# Patient Record
(demographics unavailable — no encounter records)

---

## 2024-12-10 NOTE — PHYSICAL EXAM

## 2024-12-10 NOTE — ASSESSMENT
[FreeTextEntry1] : Patient with bubbling in the stomach and diarrhea.  This started after she ate food out of the freezer.  The food was not dated.  She may have developed an episode of gastroenteritis.  CAT scan in the emergency room revealed evidence of ileus with dilated small bowel without any transition point.  Patient has improved but still has some loose bowel movement once a day without mucus or blood.  She will be given a course of Xifaxan for 10 days.  Blood work will be done.  FOBT, fecal calprotectin, and stool PCR will be ordered.  Iron studies will be done.

## 2024-12-10 NOTE — HISTORY OF PRESENT ILLNESS
[FreeTextEntry1] : 12/10/2024-patient has a 84-year-old female who has generally been in good health.  She presented with severe diarrhea which started before Thanksgiving.  She took some food out of the freezer that had been there for a while and was not dated.  She developed multiple bouts of watery diarrhea and took Pepto-Bismol and Kaopectate.  She did not have any abdominal pain but described a bubbling sensation in the mid abdomen.  She was seen in the emergency room on 2 occasions.  The first 1 was the end of November and also the beginning of December.  CAT scan on both occasions revealed dilated small bowel consistent with ileus without any transition point suggesting small bowel obstruction. At the end of November she was given antibiotics to take.  She took the antibiotics for 10 days but only once a day.  Her symptoms have improved but she still has a loose bowel movement per day.  She has no nausea or vomiting.  She has reduced her p.o. intake.  She claims to have lost about 20 pounds over the past year but she is also changed her diet. Blood work from the emergency room revealed H/H 9.6/29.5, MCV 89.1, creatinine 1.33.  A year ago she did have evidence of iron deficiency.  Attached below is the emergency room note and the CAT scan from the ER visit.   - Chief Complaint: The patient is a 84y Female complaining of - HPI Objective Statement: 84F with no significant pmhx presents to the ED complaining of lower abdominal discomfort x 2 weeks. Patient describes the discomfort as a "bubbly" feeling that radiates across the lower abdomen. She states symptoms are associated with eating and drinking, and thus has decreased PO intake. Patient denies prior abdominal surgeries, prior colonoscopy, or recent travel. She was evaluated in the ED 11/24 and 12/4 for same symptoms. She had CT noncon and CT with contrast of abd/pel with dilated small bowel suggestive of ileus with enteritis. Patient presented with diarrhea at that time and was given Augmentin x 10 days. She reports she is only taking 1 pill of Augmentin each day so is still taking it. States her diarrhea improved - now reporting loose stool. Nonbloody and no melena. Denies nausea, vomiting, chest pain, sob, urinary symptoms, fever, or chills.    ACC: [id number]     EXAM:  CT ABDOMEN AND PELVIS OC   ORDERED BY: RILEY CARDONA  PROCEDURE DATE:  12/04/2024    INTERPRETATION:  CLINICAL INFORMATION: Evaluate for small bowel obstruction  COMPARISON: None  CONTRAST/COMPLICATIONS: IV Contrast: None Oral Contrast: Omnipaque 300   PROCEDURE: CT of the Abdomen and Pelvis was performed. Sagittal and coronal reformats were performed.  FINDINGS: LOWER CHEST: Valvular calcifications. Trace pericardial fluid. Bibasilar atelectasis, right greater than left.  LIVER: The noncontrast appearance of the liver is unremarkable. BILE DUCTS: Normal caliber. GALLBLADDER: Within normal limits. SPLEEN: Within normal limits. PANCREAS: Within normal limits. ADRENALS: Mild thickening of the adrenal glands, likely hyperplasia. KIDNEYS/URETERS: No hydronephrosis  BLADDER: The bladder is decompressed. REPRODUCTIVE ORGANS: Multiple calcified uterine fibroids.  BOWEL: The stomach is normal in caliber. There is diffuse dilation of the small bowel, measuring up to 4 cm. The dilation extends to the level of the terminal ileum. Additionally, there is fluid within a nondilated colon. These findings are most suggestive of an ileus. Normal appendix. PERITONEUM/RETROPERITONEUM: Within normal limits. VESSELS: Atherosclerotic changes. LYMPH NODES: No lymphadenopathy. ABDOMINAL WALL: Within normal limits. BONES: Multilevel degenerative changes.  IMPRESSION:  Findings suggestive of ileus. No definite transition point to suggest obstruction.    --- End of Report ---

## 2025-01-22 NOTE — ASSESSMENT
[FreeTextEntry1] : 84-year-old female status post laparoscopic right hemicolectomy recovering well.  Plan: Follow-up with oncology Follow-up with me in 1 month

## 2025-01-22 NOTE — HISTORY OF PRESENT ILLNESS
[FreeTextEntry1] : 84-year-old female status post laparoscopic right hemicolectomy for ascending colon near obstructing lesion.  Her pathology has resulted.  She feels well and having normal bowel function.

## 2025-01-29 NOTE — HISTORY OF PRESENT ILLNESS
[Disease: _____________________] : Disease: [unfilled] [T: ___] : T[unfilled] [N: ___] : N[unfilled] [de-identified] : Ms. Hendrickson is an 84 yr old woman with no significant PMH who presents for initial consultation for recently diagnosed colon cancer   Chronological Hx of Cancer: November '24-Dec '24: 2 ED visits for enteritis/ileus/obstruction (on CTs); GI PCR negative, FOBT (+), iron deficient 12/23/24-01/10/25: hospitalized to [facility] for abdominal discomfort and multifocal PNA 12/23/24: CT a/p showed SBO vs. 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 arising in a tubular adenoma w high grade dysplasia   PMH: uterine fibroids PSxHx: colonoscopy (12/26/24), R hemicolectomy (01/08/25); tonsillectomy, L ankle surgery Meds: eliquis, diltiazem, tylenol Social Hx: never smoker, no alcohol use, no drugs; lives alone; has 2 grown sons, one in [place] and one in [place]; drives alone; lives in [facility] Hx:  Allergies: NKA  Molecular: NABEEL Sites: Cecal   [de-identified] : 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 Saw surgery for f/u on 1/22/25- wounds healing well, staples removed. Never had prior colonoscopies before these recent hospitalizations, never was sick or hospitalized. Feels well. Eating solids. Has 2-3 meals a day. Has ~2 BMs daily, some loose others well formed depending on diet. No blood in BMs. No straining. Her baseline BMs were BID+ depending on diet. Taking diltiazem but not eliquis.

## 2025-01-29 NOTE — REVIEW OF SYSTEMS
[Diarrhea: Grade 1 - Increase of <4 stools per day over baseline; mild increase in ostomy output compared to baseline] : Diarrhea: Grade 1 - Increase of <4 stools per day over baseline; mild increase in ostomy output compared to baseline [Fever] : no fever [Chills] : no chills [Night Sweats] : no night sweats [Fatigue] : no fatigue [Dysphagia] : no dysphagia [Odynophagia] : no odynophagia [Chest Pain] : no chest pain [Palpitations] : no palpitations [Shortness Of Breath] : no shortness of breath [Wheezing] : no wheezing [Cough] : no cough [Abdominal Pain] : no abdominal pain [Vomiting] : no vomiting [Constipation] : no constipation [Dysuria] : no dysuria [Skin Rash] : no skin rash [Easy Bleeding] : no tendency for easy bleeding [Easy Bruising] : no tendency for easy bruising

## 2025-01-29 NOTE — HISTORY OF PRESENT ILLNESS
[Disease: _____________________] : Disease: [unfilled] [T: ___] : T[unfilled] [N: ___] : N[unfilled] [de-identified] : Ms. Hendrickson is an 84 yr old woman with no significant PMH who presents for initial consultation for recently diagnosed colon cancer   Chronological Hx of Cancer: November '24-Dec '24: 2 ED visits for enteritis/ileus/obstruction (on CTs); GI PCR negative, FOBT (+), iron deficient 12/23/24-01/10/25: hospitalized to [facility] for abdominal discomfort and multifocal PNA 12/23/24: CT a/p showed SBO vs. 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 arising in a tubular adenoma w high grade dysplasia   PMH: uterine fibroids PSxHx: colonoscopy (12/26/24), R hemicolectomy (01/08/25); tonsillectomy, L ankle surgery Meds: eliquis, diltiazem, tylenol Social Hx: never smoker, no alcohol use, no drugs; lives alone; has 2 grown sons, one in [place] and one in [place]; drives alone; lives in [place] [facility] Hx:  Allergies: NKA  Molecular: NABEEL Sites: Cecal   [de-identified] : 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 Saw surgery for f/u on 1/22/25- wounds healing well, staples removed. Never had prior colonoscopies before these recent hospitalizations, never was sick or hospitalized. Feels well. Eating solids. Has 2-3 meals a day. Has ~2 BMs daily, some loose others well formed depending on diet. No blood in BMs. No straining. Her baseline BMs were BID+ depending on diet. Taking diltiazem but not eliquis.

## 2025-01-29 NOTE — PHYSICAL EXAM
[Restricted in physically strenuous activity but ambulatory and able to carry out work of a light or sedentary nature] : Status 1- Restricted in physically strenuous activity but ambulatory and able to carry out work of a light or sedentary nature, e.g., light house work, office work [Normal] : no JVD, no calf tenderness, venous stasis changes, varices [de-identified] : Normal WoB [de-identified] : RRR [de-identified] : Nontender, well healing

## 2025-01-29 NOTE — RESULTS/DATA
[FreeTextEntry1] : LABS: 12/31/24: CEA  2.6,  107, CA 19-9 <2  PATHOLOGY REPORTS:  ACCESSION No:  [id number] Patient:     KAVITHA WOODALL Accession:                             [id number] Collected Date/Time:                   12/26/2024 12:35 EST Received Date/Time:                    12/26/2024 12:35 EST Surgical Pathology Report - Auth (Verified) Specimen(s) Submitted 1-Cecal mass bx Final Diagnosis 1.  Colon, cecal mass, biopsy: -   Moderately differentiated invasive adenocarcinoma.  See comment.  Comment:  diagnosis confirmed upon review by another member of the GI  pathology department. Dr. Hartley notified via secure email. Verified by: Cornelius Singh MD (Electronic Signature) Reported on: 12/27/24 14:41 EST, [facility] [id number] [place] [address] Phone: [phone]   Fax: [phone] _________________________________________________________________  ACCESSION No:  [id number] Patient:     KAVITHA WOODALL Accession:                             [id number] Collected Date/Time:                   1/8/2025 09:12 EST Received Date/Time:                    1/9/2025 09:12 EST Addendum Report - Auth (Verified) Addendum Reporting Template (CAP June 2021) DNA MISMATCH REPAIR TESTING Specimen Site: Cecum and ileocecal valve Testing Performed on Block Number(s) (specify): 1G Immunohistochemistry Testing (IHC) for Mismatch Repair (MMR) Proteins show following result: MLH1:  Intact nuclear expression MSH2:  Intact nuclear expression MSH6:  Intact nuclear expression PMS2:  Intact nuclear expression MMR Interpretation: No loss of nuclear expression of MMR proteins (MLH1, MSH2, MSH6, and PMS2).  These results show low probability of microsatellite instability-high (MSI-H). There is no evidence of DNA mismatch repair deficiency in the analyzed tissue, indicating there is a low probability for Hernandes syndrome (a common cause hereditary non polyposis colorectal cancer or HNPCC). However, intact expression of all 4 proteins indicates that MMR enzyme tested are intact but does not entirely exclude Hernandes syndrome, as approximately 5 % of families may have a missense mutation (especially MLH1) that can lead to a nonfunctional protein with retained antigenicity. Clinical correlation with further evaluation is suggested.   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 (6.0 cm) arising in a tubular adenoma with high-grade dysplasia - Small vessel lymphovascular invasion present - Perineural invasion present - Margins of resection are negative for neoplasm - One lymph node positive for carcinoma (1/38) - Benign appendix  Histologic Type:   Adenocarcinoma Histologic Grade:   G2, moderately differentiated Tumor Size:  6.0 Centimeters (cm) Tumor Extent:   Invades through muscularis propria into the pericolonic or perirectal tissue Macroscopic Tumor Perforation:   Not identified Lymphatic and / or Vascular Invasion:    Small vessel Perineural Invasion:   Present Tumor Budding Score:   Intermediate (5-9) Number of Tumor Buds:   6 per 'hotspot' field Type of Polyp in which Invasive Carcinoma Arose:    Tubular adenoma Treatment Effect:   No known presurgical therapy Margins Margin Status for Invasive Carcinoma:    All margins negative for invasive carcinoma pTNM Classification (AJCC 8th Edition) pT Category:   pT3 pN Category:   pN1a

## 2025-01-29 NOTE — RESULTS/DATA
[FreeTextEntry1] : LABS: 12/31/24: CEA  2.6,  107, CA 19-9 <2  PATHOLOGY REPORTS:  ACCESSION No:  [id number] Patient:     KAVITHA WOODALL Accession:                             [id number] Collected Date/Time:                   12/26/2024 12:35 EST Received Date/Time:                    12/26/2024 12:35 EST Surgical Pathology Report - Auth (Verified) Specimen(s) Submitted 1-Cecal mass bx Final Diagnosis 1.  Colon, cecal mass, biopsy: -   Moderately differentiated invasive adenocarcinoma.  See comment.  Comment:  diagnosis confirmed upon review by another member of the GI  pathology department. Dr. Hartley notified via secure email. Verified by: Cornelius Singh MD (Electronic Signature) Reported on: 12/27/24 14:41 EST, [facility] [address] [facility] [place] [address] Phone: [phone]   Fax: [phone] _________________________________________________________________  ACCESSION No:  [id number] Patient:     KAVITHA WOODALL Accession:                             [id number] Collected Date/Time:                   1/8/2025 09:12 EST Received Date/Time:                    1/9/2025 09:12 EST Addendum Report - Auth (Verified) Addendum Reporting Template (CAP June 2021) DNA MISMATCH REPAIR TESTING Specimen Site: Cecum and ileocecal valve Testing Performed on Block Number(s) (specify): 1G Immunohistochemistry Testing (IHC) for Mismatch Repair (MMR) Proteins show following result: MLH1:  Intact nuclear expression MSH2:  Intact nuclear expression MSH6:  Intact nuclear expression PMS2:  Intact nuclear expression MMR Interpretation: No loss of nuclear expression of MMR proteins (MLH1, MSH2, MSH6, and PMS2).  These results show low probability of microsatellite instability-high (MSI-H). There is no evidence of DNA mismatch repair deficiency in the analyzed tissue, indicating there is a low probability for Hernandes syndrome (a common cause hereditary non polyposis colorectal cancer or HNPCC). However, intact expression of all 4 proteins indicates that MMR enzyme tested are intact but does not entirely exclude Hernandes syndrome, as approximately 5 % of families may have a missense mutation (especially MLH1) that can lead to a nonfunctional protein with retained antigenicity. Clinical correlation with further evaluation is suggested.   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 (6.0 cm) arising in a tubular adenoma with high-grade dysplasia - Small vessel lymphovascular invasion present - Perineural invasion present - Margins of resection are negative for neoplasm - One lymph node positive for carcinoma (1/38) - Benign appendix  Histologic Type:   Adenocarcinoma Histologic Grade:   G2, moderately differentiated Tumor Size:  6.0 Centimeters (cm) Tumor Extent:   Invades through muscularis propria into the pericolonic or perirectal tissue Macroscopic Tumor Perforation:   Not identified Lymphatic and / or Vascular Invasion:    Small vessel Perineural Invasion:   Present Tumor Budding Score:   Intermediate (5-9) Number of Tumor Buds:   6 per 'hotspot' field Type of Polyp in which Invasive Carcinoma Arose:    Tubular adenoma Treatment Effect:   No known presurgical therapy Margins Margin Status for Invasive Carcinoma:    All margins negative for invasive carcinoma pTNM Classification (AJCC 8th Edition) pT Category:   pT3 pN Category:   pN1a

## 2025-01-29 NOTE — ASSESSMENT
[FreeTextEntry1] : Ms. Fermin is a 84 year old woman with recent history of stage III colon cancer who presents status post curative intent resection 12/2024.   Today we discussed the natural history of colon cancer, indications for treatment, data from [facility] and [facility] which indicate a significant benefit of treatment for stage III colon cancer. We went on to discuss that given the presence of brannon disease, she is considered to harbor high-risk disease and chemotherapy would be appropriate. Although there is ~ a 40% chance that her disease has been eradicated by surgery alone, the addition of adjuvant chemotherapy would reduce that risk by about an additional 25%. In the setting of stage III colon cancer I would normally recommend a combination of 5-FU/Capecitabine and oxaliplatin. However, due to advanced age and based on [facility] post-hoc analysis, Oxaliplatin is of questionable benefit given her age. As such, we will proceed with 3-6 months of capecitabine and watch closely for side effects.   #Stage III Colon Cancer: - Not appropriate for CIRCULATE given age - CAPEOX x 3-6 months       - 1000mg/m2 BID Capecitabine D1-D14 (Round down to 2 pills 500 in AM and 3 pills 500 in AM based on BSA 1.5)  #Chemotherapy Induced Diarrhea - Loperamide  #Chemotherapy Induced Nausea - Compazine/Zofran  #Chemotherapy Induced Hand Foot Syndrome Prevention - Diclofenac  RTC in 3 weeks to start therapy (aim for 6 weeks post-operative.

## 2025-01-29 NOTE — PHYSICAL EXAM
[Restricted in physically strenuous activity but ambulatory and able to carry out work of a light or sedentary nature] : Status 1- Restricted in physically strenuous activity but ambulatory and able to carry out work of a light or sedentary nature, e.g., light house work, office work [Normal] : no JVD, no calf tenderness, venous stasis changes, varices [de-identified] : Normal WoB [de-identified] : RRR [de-identified] : Nontender, well healing

## 2025-02-05 NOTE — HISTORY OF PRESENT ILLNESS
[FreeTextEntry1] : Referring Physician:   Dear     Ms. Hendrickson was seen in the [facility] today. For our records, please allow me to summarize the history and my findings.   This pleasant 84-year-old woman has a cardiovascular history significant for colon CA presents with SBO findings with cecal mass and multifocal pneumonia and R upper segmental PE findings on CT of chest. Patient underwent R hemicolectomy on 1/8. She was noticed having frequent intermittent short bursts of SVT up to 170 bpm this am. Endorses "mild palpitation" associated with SVT. Denies SOB or chest pain or syncope. Denies prior hx of tachycardia. Baseline EKG sinus tachy with RBBB. SVT appears short RP. Self terminated with each episode of SVT seen at bedside. Remains hemodynamically in SVT. 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 She is taking diltiazem 240 mg daily but did not start the Eliquis 5 mg twice a day.    Ms. Hendrickson denies any recent history of chest pain, shortness of breath, palpitations, dizziness, or syncope.

## 2025-02-05 NOTE — DISCUSSION/SUMMARY
[FreeTextEntry1] :   In summary, this is an 84-year-old woman with for colon CA presents with SBO findings with cecal mass and multifocal pneumonia and R upper segmental PE findings on CT of chest. Patient underwent R hemicolectomy on 1/8. She was noticed having frequent intermittent short bursts of SVT up to 170 bpm. Will continue diltiazem 240 mg daily and will start the Eliquis for PE. She will follow up in the office in 3months and may discuss ablation at that time.   MS. Hendrickson appeared to understand the whole discussion and verbalized that all of his questions were answered to his satisfaction.  Thank you for allowing me to be involved in the care of this pleasant woman. Please feel free to contact me with any questions.  [EKG obtained to assist in diagnosis and management of assessed problem(s)] : EKG obtained to assist in diagnosis and management of assessed problem(s)

## 2025-02-05 NOTE — CARDIOLOGY SUMMARY
[de-identified] : 1/27/2025 TTE CONCLUSIONS:  1. Left ventricular cavity is normal in size. Left ventricular wall thickness is normal. Left ventricular systolic function is normal with an ejection fraction of 75 % by Mandujano's method of disks. There are no regional wall motion abnormalities seen.  2. There is mild (grade 1) left ventricular diastolic dysfunction.  3. Normal right ventricular cavity size and normal right ventricular systolic function. Tricuspid annular plane systolic excursion (TAPSE) is 2.0 cm (normal >=1.7 cm).  4. Structurally normal mitral valve with normal leaflet excursion. There is reduced leaflet mobility of the mitral valve. There is calcification of the mitral valve annulus. There is mild mitral valve stenosis. The transmitral peak gradient is 8.2 mmHg and mean gradient is 3.15 mmHg. There is trace mitral regurgitation.  5. The aortic valve appears trileaflet with reduced systolic excursion. There is calcification of the aortic valve leaflets. There is mild aortic stenosis. The peak transaortic velocity is 2.55 m/s, peak transaortic gradient is 26.0 mmHg and mean transaortic gradient is 14.2 mmHg with an LVOT/aortic valve VTI ratio of 0.59.  6. The left atrium is severely dilated with an indexed volume of 58.74 ml/m.   [de-identified] : 2/5/25 Sinus rhythm 89, RBBB, NSST

## 2025-02-19 NOTE — HISTORY OF PRESENT ILLNESS
[Disease: _____________________] : Disease: [unfilled] [T: ___] : T[unfilled] [N: ___] : N[unfilled] [de-identified] : Ms. Hendrickson is an 84 yr old woman with no significant PMH who presents for initial consultation for recently diagnosed colon cancer   Chronological Hx of Cancer: November '24-Dec '24: 2 ED visits for enteritis/ileus/obstruction (on CTs); GI PCR negative, FOBT (+), iron deficient 12/23/24-01/10/25: hospitalized to [facility] for abdominal discomfort and multifocal PNA 12/23/24: CT a/p showed SBO vs. 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 Saw surgery for f/u on 1/22/25- wounds healing well, staples removed. Never had prior colonoscopies before these recent hospitalizations, never was sick or hospitalized. Feels well. Eating solids. Has 2-3 meals a day. Has ~2 BMs daily, some loose others well formed depending on diet. No blood in BMs. No straining. Her baseline BMs were BID+ depending on diet. Taking diltiazem but not eliquis.   PMH: uterine fibroids PSxHx: colonoscopy (12/26/24), R hemicolectomy (01/08/25); tonsillectomy, L ankle surgery Meds: eliquis, diltiazem, tylenol Social Hx: never smoker, no alcohol use, no drugs; lives alone; has 2 grown sons, one in [place] and one in [place]; drives alone; lives in 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 She is taking Eliquis. Plan for Xeloda 2 weeks on, one week off.

## 2025-02-19 NOTE — PHYSICAL EXAM
[Restricted in physically strenuous activity but ambulatory and able to carry out work of a light or sedentary nature] : Status 1- Restricted in physically strenuous activity but ambulatory and able to carry out work of a light or sedentary nature, e.g., light house work, office work [Normal] : no JVD, no calf tenderness, venous stasis changes, varices [de-identified] : Normal WoB [de-identified] : RRR [de-identified] : Nontender, well healing

## 2025-02-19 NOTE — ASSESSMENT
[FreeTextEntry1] : Ms. Fermin is a 84 year old woman with recent history of stage III colon cancer who presents status post curative intent resection 12/2024.   Today we discussed the natural history of colon cancer, indications for treatment, data from IDEA and MOSAIC which indicate a significant benefit of treatment for stage III colon cancer. We went on to discuss that given the presence of brannon disease, she is considered to harbor high-risk disease and chemotherapy would be appropriate. Although there is ~ a 40% chance that her disease has been eradicated by surgery alone, the addition of adjuvant chemotherapy would reduce that risk by about an additional 25%. In the setting of stage III colon cancer I would normally recommend a combination of 5-FU/Capecitabine and oxaliplatin. However, due to advanced age and based on MOSAIC post-hoc analysis, Oxaliplatin is of questionable benefit given her age. As such, we will proceed with 3-6 months of capecitabine and watch closely for side effects.   2/19/25: C1D1 Capecitabine. Does not yet have diclofenac, will pickup today.   #Stage III Colon Cancer: - Not appropriate for CIRCULATE given age - Capecitabine x 6 months       - 1000mg/m2 BID Capecitabine D1-D14 (Round down to 2 pills 500 in AM and 3 pills 500 in AM based on BSA 1.5) - Diclofenac gel for hands/feet  #Chemotherapy Induced Diarrhea - Loperamide  #Chemotherapy Induced Nausea - Compazine/Zofran  #Chemotherapy Induced Hand Foot Syndrome Prevention - Diclofenac  RTC in 3 weeks to monitor response to therapy  Seen with fellow Dr. uMir

## 2025-02-19 NOTE — RESULTS/DATA
[FreeTextEntry1] : LABS: 12/31/24: CEA  2.6,  107, CA 19-9 <2  PATHOLOGY REPORTS:  ACCESSION No:  [id number] Patient:     KAVITHA WOODALL Accession:                             [id number] Collected Date/Time:                   12/26/2024 12:35 EST Received Date/Time:                    12/26/2024 12:35 EST Surgical Pathology Report - Auth (Verified) Specimen(s) Submitted 1-Cecal mass bx Final Diagnosis 1.  Colon, cecal mass, biopsy: -   Moderately differentiated invasive adenocarcinoma.  See comment.  Comment:  diagnosis confirmed upon review by another member of the GI  pathology department. Dr. Hartley notified via secure email. Verified by: Cornelius Singh MD (Electronic Signature) Reported on: 12/27/24 14:41 EST, [facility] [id number] [facility] [place] [address] Phone: [phone]   Fax: [phone] _________________________________________________________________  ACCESSION No:  [id number] Patient:     KAVITHA WOODALL Accession:                             [id number] Collected Date/Time:                   1/8/2025 09:12 EST Received Date/Time:                    1/9/2025 09:12 EST Addendum Report - Auth (Verified) Addendum Reporting Template (CAP June 2021) DNA MISMATCH REPAIR TESTING Specimen Site: Cecum and ileocecal valve Testing Performed on Block Number(s) (specify): 1G Immunohistochemistry Testing (IHC) for Mismatch Repair (MMR) Proteins show following result: MLH1:  Intact nuclear expression MSH2:  Intact nuclear expression MSH6:  Intact nuclear expression PMS2:  Intact nuclear expression MMR Interpretation: No loss of nuclear expression of MMR proteins (MLH1, MSH2, MSH6, and PMS2).  These results show low probability of microsatellite instability-high (MSI-H). There is no evidence of DNA mismatch repair deficiency in the analyzed tissue, indicating there is a low probability for Hernandes syndrome (a common cause hereditary non polyposis colorectal cancer or HNPCC). However, intact expression of all 4 proteins indicates that MMR enzyme tested are intact but does not entirely exclude Hernandes syndrome, as approximately 5 % of families may have a missense mutation (especially MLH1) that can lead to a nonfunctional protein with retained antigenicity. Clinical correlation with further evaluation is suggested.   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 (6.0 cm) arising in a tubular adenoma with high-grade dysplasia - Small vessel lymphovascular invasion present - Perineural invasion present - Margins of resection are negative for neoplasm - One lymph node positive for carcinoma (1/38) - Benign appendix  Histologic Type:   Adenocarcinoma Histologic Grade:   G2, moderately differentiated Tumor Size:  6.0 Centimeters (cm) Tumor Extent:   Invades through muscularis propria into the pericolonic or perirectal tissue Macroscopic Tumor Perforation:   Not identified Lymphatic and / or Vascular Invasion:    Small vessel Perineural Invasion:   Present Tumor Budding Score:   Intermediate (5-9) Number of Tumor Buds:   6 per 'hotspot' field Type of Polyp in which Invasive Carcinoma Arose:    Tubular adenoma Treatment Effect:   No known presurgical therapy Margins Margin Status for Invasive Carcinoma:    All margins negative for invasive carcinoma pTNM Classification (AJCC 8th Edition) pT Category:   pT3 pN Category:   pN1a

## 2025-03-12 NOTE — REASON FOR VISIT
[Follow-Up Visit] : a follow-up Rhombic Flap Text: The defect edges were debeveled with a #15 scalpel blade.  Given the location of the defect and the proximity to free margins a rhombic flap was deemed most appropriate.  Using a sterile surgical marker, an appropriate rhombic flap was drawn incorporating the defect.    The area thus outlined was incised deep to adipose tissue with a #15 scalpel blade.  The skin margins were undermined to an appropriate distance in all directions utilizing iris scissors.

## 2025-03-12 NOTE — RESULTS/DATA
[FreeTextEntry1] : LABS: 12/31/24: CEA  2.6,  107, CA 19-9 <2  PATHOLOGY REPORTS:  ACCESSION No:  [id number] Patient:     KAVITHA WOODALL Accession:                             [id number] Collected Date/Time:                   12/26/2024 12:35 EST Received Date/Time:                    12/26/2024 12:35 EST Surgical Pathology Report - Auth (Verified) Specimen(s) Submitted 1-Cecal mass bx Final Diagnosis 1.  Colon, cecal mass, biopsy: -   Moderately differentiated invasive adenocarcinoma.  See comment.  Comment:  diagnosis confirmed upon review by another member of the GI  pathology department. Dr. Hartley notified via secure email. Verified by: Cornelius Singh MD (Electronic Signature) Reported on: 12/27/24 14:41 EST, [facility] [id number] [facility] [address] Phone: [phone]   Fax: [phone] _________________________________________________________________  ACCESSION No:  [id number] Patient:     KAVITHA WOODALL Accession:                             [id number] Collected Date/Time:                   1/8/2025 09:12 EST Received Date/Time:                    1/9/2025 09:12 EST Addendum Report - Auth (Verified) Addendum Reporting Template (CAP June 2021) DNA MISMATCH REPAIR TESTING Specimen Site: Cecum and ileocecal valve Testing Performed on Block Number(s) (specify): 1G Immunohistochemistry Testing (IHC) for Mismatch Repair (MMR) Proteins show following result: MLH1:  Intact nuclear expression MSH2:  Intact nuclear expression MSH6:  Intact nuclear expression PMS2:  Intact nuclear expression MMR Interpretation: No loss of nuclear expression of MMR proteins (MLH1, MSH2, MSH6, and PMS2).  These results show low probability of microsatellite instability-high (MSI-H). There is no evidence of DNA mismatch repair deficiency in the analyzed tissue, indicating there is a low probability for Hernandes syndrome (a common cause hereditary non polyposis colorectal cancer or HNPCC). However, intact expression of all 4 proteins indicates that MMR enzyme tested are intact but does not entirely exclude Hernandes syndrome, as approximately 5 % of families may have a missense mutation (especially MLH1) that can lead to a nonfunctional protein with retained antigenicity. Clinical correlation with further evaluation is suggested.   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 (6.0 cm) arising in a tubular adenoma with high-grade dysplasia - Small vessel lymphovascular invasion present - Perineural invasion present - Margins of resection are negative for neoplasm - One lymph node positive for carcinoma (1/38) - Benign appendix  Histologic Type:   Adenocarcinoma Histologic Grade:   G2, moderately differentiated Tumor Size:  6.0 Centimeters (cm) Tumor Extent:   Invades through muscularis propria into the pericolonic or perirectal tissue Macroscopic Tumor Perforation:   Not identified Lymphatic and / or Vascular Invasion:    Small vessel Perineural Invasion:   Present Tumor Budding Score:   Intermediate (5-9) Number of Tumor Buds:   6 per 'hotspot' field Type of Polyp in which Invasive Carcinoma Arose:    Tubular adenoma Treatment Effect:   No known presurgical therapy Margins Margin Status for Invasive Carcinoma:    All margins negative for invasive carcinoma pTNM Classification (AJCC 8th Edition) pT Category:   pT3 pN Category:   pN1a

## 2025-03-12 NOTE — PHYSICAL EXAM
[Restricted in physically strenuous activity but ambulatory and able to carry out work of a light or sedentary nature] : Status 1- Restricted in physically strenuous activity but ambulatory and able to carry out work of a light or sedentary nature, e.g., light house work, office work [Normal] : no JVD, no calf tenderness, venous stasis changes, varices [de-identified] : Normal WoB [de-identified] : RRR [de-identified] : Nontender, well healing

## 2025-03-12 NOTE — REVIEW OF SYSTEMS
[Fever] : no fever [Chills] : no chills [Night Sweats] : no night sweats [Fatigue] : no fatigue [Dysphagia] : no dysphagia [Odynophagia] : no odynophagia [Chest Pain] : no chest pain [Palpitations] : no palpitations [Shortness Of Breath] : no shortness of breath [Wheezing] : no wheezing [Cough] : no cough [Abdominal Pain] : no abdominal pain [Vomiting] : no vomiting [Constipation] : no constipation [Diarrhea: Grade 1 - Increase of <4 stools per day over baseline; mild increase in ostomy output compared to baseline] : Diarrhea: Grade 1 - Increase of <4 stools per day over baseline; mild increase in ostomy output compared to baseline [Dysuria] : no dysuria [Skin Rash] : no skin rash [Easy Bleeding] : no tendency for easy bleeding [Easy Bruising] : no tendency for easy bruising

## 2025-03-12 NOTE — HISTORY OF PRESENT ILLNESS
[Disease: _____________________] : Disease: [unfilled] [T: ___] : T[unfilled] [N: ___] : N[unfilled] [de-identified] : Ms. Hendrickson is an 84 yr old woman with no significant PMH who presents for initial consultation for recently diagnosed colon cancer   Chronological Hx of Cancer: November '24-Dec '24: 2 ED visits for enteritis/ileus/obstruction (on CTs); GI PCR negative, FOBT (+), iron deficient 12/23/24-01/10/25: hospitalized to [facility] for abdominal discomfort and multifocal PNA 12/23/24: CT a/p showed SBO vs. 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 Saw surgery for f/u on 1/22/25- wounds healing well, staples removed. Never had prior colonoscopies before these recent hospitalizations, never was sick or hospitalized. Feels well. Eating solids. Has 2-3 meals a day. Has ~2 BMs daily, some loose others well formed depending on diet. No blood in BMs. No straining. Her baseline BMs were BID+ depending on diet. Taking diltiazem but not eliquis.   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 She feels well. She is taking Eliquis. Plan for Xeloda 2 weeks on, one week off. 3/12/25: C2 Xeloda. Does not even notice she is taking oral chemotherapy.

## 2025-03-12 NOTE — ASSESSMENT
[FreeTextEntry1] : Ms. Fermin is a 84 year old woman with recent history of stage III colon cancer who presents status post curative intent resection 12/2024.   Today we discussed the natural history of colon cancer, indications for treatment, data from IDEA and MOSAIC which indicate a significant benefit of treatment for stage III colon cancer. We went on to discuss that given the presence of brannon disease, she is considered to harbor high-risk disease and chemotherapy would be appropriate. Although there is ~ a 40% chance that her disease has been eradicated by surgery alone, the addition of adjuvant chemotherapy would reduce that risk by about an additional 25%. In the setting of stage III colon cancer I would normally recommend a combination of 5-FU/Capecitabine and oxaliplatin. However, due to advanced age and based on MOSAIC post-hoc analysis, Oxaliplatin is of questionable benefit given her age. As such, we will proceed with 3-6 months of capecitabine and watch closely for side effects.   2/19/25: C1D1 Capecitabine. Does not yet have diclofenac, will pickup today.  3/12/25: C2D1 Capecitabine. Tolerating extremely well. Needs a PCP. Reinforced compliance, went over symptoms at length, rechecking labs today to assess for toxicity.   #Stage III Colon Cancer: - Not appropriate for CIRCULATE given age - Capecitabine x 6 months       - 1000mg/m2 BID Capecitabine D1-D14 (Round down to 2 pills 500 in AM and 3 pills 500 in AM based on BSA 1.5) - Diclofenac gel for hands/feet - CBC, CMP, CEA today - Imaging at time of C4  #Chemotherapy Induced Diarrhea - Loperamide  #Chemotherapy Induced Nausea - Compazine/Zofran  #Chemotherapy Induced Hand Foot Syndrome Prevention - Diclofenac  RTC in 3 weeks to monitor response to therapy [Curative] : Goals of care discussed with patient: Curative

## 2025-04-09 NOTE — PHYSICAL EXAM
[Restricted in physically strenuous activity but ambulatory and able to carry out work of a light or sedentary nature] : Status 1- Restricted in physically strenuous activity but ambulatory and able to carry out work of a light or sedentary nature, e.g., light house work, office work [Normal] : no JVD, no calf tenderness, venous stasis changes, varices [de-identified] : Normal WoB [de-identified] : RRR [de-identified] : Nontender, well healing

## 2025-04-09 NOTE — ASSESSMENT
[Curative] : Goals of care discussed with patient: Curative [FreeTextEntry1] : Ms. Fermin is a 84 year old woman with recent history of stage III colon cancer who presents status post curative intent resection 12/2024.   Today we discussed the natural history of colon cancer, indications for treatment, data from IDEA and MOSAIC which indicate a significant benefit of treatment for stage III colon cancer. We went on to discuss that given the presence of brannon disease, she is considered to harbor high-risk disease and chemotherapy would be appropriate. Although there is ~ a 40% chance that her disease has been eradicated by surgery alone, the addition of adjuvant chemotherapy would reduce that risk by about an additional 25%. In the setting of stage III colon cancer I would normally recommend a combination of 5-FU/Capecitabine and oxaliplatin. However, due to advanced age and based on MOSAIC post-hoc analysis, Oxaliplatin is of questionable benefit given her age. As such, we will proceed with 3-6 months of capecitabine and watch closely for side effects.   2/19/25: C1D1 Capecitabine. Does not yet have diclofenac, will pickup today.  3/12/25: C2D1 Capecitabine. Tolerating extremely well. Needs a PCP. Reinforced compliance, went over symptoms at length, rechecking labs today to assess for toxicity.  4/9/25: C3D1 Capecitabine. Needs a new calendar for her chemo pills.   #Stage III Colon Cancer: - Not appropriate for CIRCULATE given age - Capecitabine x 6 months       - 1000mg/m2 BID Capecitabine D1-D14 (Round down to 2 pills 500 in AM and 3 pills 500 in AM based on BSA 1.5) - Diclofenac gel for hands/feet - CBC, CMP, CEA today - Imaging at time of C4 (in 3 weeks)  #Chemotherapy Induced Diarrhea - Loperamide  #Chemotherapy Induced Nausea - Compazine/Zofran  #Chemotherapy Induced Hand Foot Syndrome Prevention - Diclofenac  RTC in 3 weeks to monitor response to therapy

## 2025-04-09 NOTE — HISTORY OF PRESENT ILLNESS
[Disease: _____________________] : Disease: [unfilled] [T: ___] : T[unfilled] [N: ___] : N[unfilled] [de-identified] : Ms. Hendrickson is an 84 yr old woman with no significant PMH who presents for initial consultation for recently diagnosed colon cancer   Chronological Hx of Cancer: November '24-Dec '24: 2 ED visits for enteritis/ileus/obstruction (on CTs); GI PCR negative, FOBT (+), iron deficient 12/23/24-01/10/25: hospitalized to [facility] for abdominal discomfort and multifocal PNA 12/23/24: CT a/p showed SBO vs. 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 Saw surgery for f/u on 1/22/25- wounds healing well, staples removed. Never had prior colonoscopies before these recent hospitalizations, never was sick or hospitalized. Feels well. Eating solids. Has 2-3 meals a day. Has ~2 BMs daily, some loose others well formed depending on diet. No blood in BMs. No straining. Her baseline BMs were BID+ depending on diet. Taking diltiazem but not eliquis.   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 She feels well. She is taking Eliquis. Plan for Xeloda 2 weeks on, one week off. 3/12/25: C2 Xeloda. Does not even notice she is taking oral chemotherapy.  3/9/25: C3 Xeloda. Still continues to notice any side effects, of any kind.

## 2025-04-09 NOTE — RESULTS/DATA
[FreeTextEntry1] : LABS: 12/31/24: CEA  2.6,  107, CA 19-9 <2  PATHOLOGY REPORTS:  ACCESSION No:  [id number] Patient:     KAVITHA WOODALL Accession:                             [id number] Collected Date/Time:                   12/26/2024 12:35 EST Received Date/Time:                    12/26/2024 12:35 EST Surgical Pathology Report - Auth (Verified) Specimen(s) Submitted 1-Cecal mass bx Final Diagnosis 1.  Colon, cecal mass, biopsy: -   Moderately differentiated invasive adenocarcinoma.  See comment.  Comment:  diagnosis confirmed upon review by another member of the GI  pathology department. Dr. Hartley notified via secure email. Verified by: Cornelius Singh MD (Electronic Signature) Reported on: 12/27/24 14:41 EST, [facility] [address] [facility] [place] [address] Phone: [phone]   Fax: [phone] _________________________________________________________________  ACCESSION No:  [id number] Patient:     KAVITHA WOODALL Accession:                             [id number] Collected Date/Time:                   1/8/2025 09:12 EST Received Date/Time:                    1/9/2025 09:12 EST Addendum Report - Auth (Verified) Addendum Reporting Template (CAP June 2021) DNA MISMATCH REPAIR TESTING Specimen Site: Cecum and ileocecal valve Testing Performed on Block Number(s) (specify): 1G Immunohistochemistry Testing (IHC) for Mismatch Repair (MMR) Proteins show following result: MLH1:  Intact nuclear expression MSH2:  Intact nuclear expression MSH6:  Intact nuclear expression PMS2:  Intact nuclear expression MMR Interpretation: No loss of nuclear expression of MMR proteins (MLH1, MSH2, MSH6, and PMS2).  These results show low probability of microsatellite instability-high (MSI-H). There is no evidence of DNA mismatch repair deficiency in the analyzed tissue, indicating there is a low probability for Hernandes syndrome (a common cause hereditary non polyposis colorectal cancer or HNPCC). However, intact expression of all 4 proteins indicates that MMR enzyme tested are intact but does not entirely exclude Hernandes syndrome, as approximately 5 % of families may have a missense mutation (especially MLH1) that can lead to a nonfunctional protein with retained antigenicity. Clinical correlation with further evaluation is suggested.   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 (6.0 cm) arising in a tubular adenoma with high-grade dysplasia - Small vessel lymphovascular invasion present - Perineural invasion present - Margins of resection are negative for neoplasm - One lymph node positive for carcinoma (1/38) - Benign appendix  Histologic Type:   Adenocarcinoma Histologic Grade:   G2, moderately differentiated Tumor Size:  6.0 Centimeters (cm) Tumor Extent:   Invades through muscularis propria into the pericolonic or perirectal tissue Macroscopic Tumor Perforation:   Not identified Lymphatic and / or Vascular Invasion:    Small vessel Perineural Invasion:   Present Tumor Budding Score:   Intermediate (5-9) Number of Tumor Buds:   6 per 'hotspot' field Type of Polyp in which Invasive Carcinoma Arose:    Tubular adenoma Treatment Effect:   No known presurgical therapy Margins Margin Status for Invasive Carcinoma:    All margins negative for invasive carcinoma pTNM Classification (AJCC 8th Edition) pT Category:   pT3 pN Category:   pN1a

## 2025-04-30 NOTE — RESULTS/DATA
[FreeTextEntry1] : LABS: 12/31/24: CEA  2.6,  107, CA 19-9 <2  PATHOLOGY REPORTS:  ACCESSION No:  [id number] Patient:     KAVITHA WOODALL Accession:                             [id number] Collected Date/Time:                   12/26/2024 12:35 EST Received Date/Time:                    12/26/2024 12:35 EST Surgical Pathology Report - Auth (Verified) Specimen(s) Submitted 1-Cecal mass bx Final Diagnosis 1.  Colon, cecal mass, biopsy: -   Moderately differentiated invasive adenocarcinoma.  See comment.  Comment:  diagnosis confirmed upon review by another member of the GI  pathology department. Dr. Hartley notified via secure email. Verified by: Cornelius Singh MD (Electronic Signature) Reported on: 12/27/24 14:41 EST, [facility] [address] [place] [address] Phone: [phone]   Fax: [phone] _________________________________________________________________  ACCESSION No:  [id number] Patient:     KAVITHA WOODALL Accession:                             [id number] Collected Date/Time:                   1/8/2025 09:12 EST Received Date/Time:                    1/9/2025 09:12 EST Addendum Report - Auth (Verified) Addendum Reporting Template (CAP June 2021) DNA MISMATCH REPAIR TESTING Specimen Site: Cecum and ileocecal valve Testing Performed on Block Number(s) (specify): 1G Immunohistochemistry Testing (IHC) for Mismatch Repair (MMR) Proteins show following result: MLH1:  Intact nuclear expression MSH2:  Intact nuclear expression MSH6:  Intact nuclear expression PMS2:  Intact nuclear expression MMR Interpretation: No loss of nuclear expression of MMR proteins (MLH1, MSH2, MSH6, and PMS2).  These results show low probability of microsatellite instability-high (MSI-H). There is no evidence of DNA mismatch repair deficiency in the analyzed tissue, indicating there is a low probability for Hernandes syndrome (a common cause hereditary non polyposis colorectal cancer or HNPCC). However, intact expression of all 4 proteins indicates that MMR enzyme tested are intact but does not entirely exclude Hernandes syndrome, as approximately 5 % of families may have a missense mutation (especially MLH1) that can lead to a nonfunctional protein with retained antigenicity. Clinical correlation with further evaluation is suggested.   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 (6.0 cm) arising in a tubular adenoma with high-grade dysplasia - Small vessel lymphovascular invasion present - Perineural invasion present - Margins of resection are negative for neoplasm - One lymph node positive for carcinoma (1/38) - Benign appendix  Histologic Type:   Adenocarcinoma Histologic Grade:   G2, moderately differentiated Tumor Size:  6.0 Centimeters (cm) Tumor Extent:   Invades through muscularis propria into the pericolonic or perirectal tissue Macroscopic Tumor Perforation:   Not identified Lymphatic and / or Vascular Invasion:    Small vessel Perineural Invasion:   Present Tumor Budding Score:   Intermediate (5-9) Number of Tumor Buds:   6 per 'hotspot' field Type of Polyp in which Invasive Carcinoma Arose:    Tubular adenoma Treatment Effect:   No known presurgical therapy Margins Margin Status for Invasive Carcinoma:    All margins negative for invasive carcinoma pTNM Classification (AJCC 8th Edition) pT Category:   pT3 pN Category:   pN1a

## 2025-04-30 NOTE — PHYSICAL EXAM
[Restricted in physically strenuous activity but ambulatory and able to carry out work of a light or sedentary nature] : Status 1- Restricted in physically strenuous activity but ambulatory and able to carry out work of a light or sedentary nature, e.g., light house work, office work [Normal] : no JVD, no calf tenderness, venous stasis changes, varices [de-identified] : Normal WoB [de-identified] : RRR [de-identified] : Nontender, well healing

## 2025-04-30 NOTE — ASSESSMENT
[Curative] : Goals of care discussed with patient: Curative [FreeTextEntry1] : Ms. Fermin is a 84 year old woman with recent history of stage III colon cancer who presents status post curative intent resection 12/2024.   Today we discussed the natural history of colon cancer, indications for treatment, data from IDEA and MOSAIC which indicate a significant benefit of treatment for stage III colon cancer. We went on to discuss that given the presence of brannon disease, she is considered to harbor high-risk disease and chemotherapy would be appropriate. Although there is ~ a 40% chance that her disease has been eradicated by surgery alone, the addition of adjuvant chemotherapy would reduce that risk by about an additional 25%. In the setting of stage III colon cancer I would normally recommend a combination of 5-FU/Capecitabine and oxaliplatin. However, due to advanced age and based on MOSAIC post-hoc analysis, Oxaliplatin is of questionable benefit given her age. As such, we will proceed with 3-6 months of capecitabine and watch closely for side effects.   2/19/25: C1D1 Capecitabine. Does not yet have diclofenac, will pickup today.  3/12/25: C2D1 Capecitabine. Tolerating extremely well. Needs a PCP. Reinforced compliance, went over symptoms at length, rechecking labs today to assess for toxicity.  4/9/25: C3D1 Capecitabine. Needs a new calendar for her chemo pills.  4/30/25: C4D1 Capecitabine. Interval imaging with STERLING. Continues to take iron intermittently.   #Stage III Colon Cancer: STERLING 4/30/25 on 3 month interval imaging.  - Not appropriate for CIRCULATE given age - Capecitabine x 6 months       - 1000mg/m2 BID Capecitabine D1-D14 (Round down to 2 pills 500 in AM and 3 pills 500 in AM based on BSA 1.5) - Diclofenac gel for hands/feet - CBC, CMP, CEA today - Imaging at time of C4 (in 3 weeks)  #Chemotherapy Induced Diarrhea - Loperamide  #Chemotherapy Induced Nausea - Compazine/Zofran  #Chemotherapy Induced Hand Foot Syndrome Prevention - Diclofenac  RTC in 3 weeks to monitor response to therapy

## 2025-04-30 NOTE — HISTORY OF PRESENT ILLNESS
[Disease: _____________________] : Disease: [unfilled] [T: ___] : T[unfilled] [N: ___] : N[unfilled] [de-identified] : Ms. Hendrickson is an 84 yr old woman with no significant PMH who presents for initial consultation for recently diagnosed colon cancer   Chronological Hx of Cancer: November '24-Dec '24: 2 ED visits for enteritis/ileus/obstruction (on CTs); GI PCR negative, FOBT (+), iron deficient 12/23/24-01/10/25: hospitalized to [facility] for abdominal discomfort and multifocal PNA 12/23/24: CT a/p showed SBO vs. 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 Saw surgery for f/u on 1/22/25- wounds healing well, staples removed. Never had prior colonoscopies before these recent hospitalizations, never was sick or hospitalized. Feels well. Eating solids. Has 2-3 meals a day. Has ~2 BMs daily, some loose others well formed depending on diet. No blood in BMs. No straining. Her baseline BMs were BID+ depending on diet. Taking diltiazem but not eliquis.   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 She feels well. She is taking Eliquis. Plan for Xeloda 2 weeks on, one week off. 3/12/25: C2 Xeloda. Does not even notice she is taking oral chemotherapy.  3/9/25: C3 Xeloda. Still continues to notice any side effects, of any kind.  4/30/25: C4 Xeloda. No side effects. Can't gain weight, but thats about all.

## 2025-05-28 NOTE — ASSESSMENT
[Curative] : Goals of care discussed with patient: Curative [FreeTextEntry1] : Ms. Fermin is a 84 year old woman with recent history of stage III colon cancer who presents status post curative intent resection 12/2024.   Today we discussed the natural history of colon cancer, indications for treatment, data from IDEA and MOSAIC which indicate a significant benefit of treatment for stage III colon cancer. We went on to discuss that given the presence of brannon disease, she is considered to harbor high-risk disease and chemotherapy would be appropriate. Although there is ~ a 40% chance that her disease has been eradicated by surgery alone, the addition of adjuvant chemotherapy would reduce that risk by about an additional 25%. In the setting of stage III colon cancer I would normally recommend a combination of 5-FU/Capecitabine and oxaliplatin. However, due to advanced age and based on MOSAIC post-hoc analysis, Oxaliplatin is of questionable benefit given her age. As such, we will proceed with 3-6 months of capecitabine and watch closely for side effects.   2/19/25: C1D1 Capecitabine. Does not yet have diclofenac, will pickup today.  3/12/25: C2D1 Capecitabine. Tolerating extremely well. Needs a PCP. Reinforced compliance, went over symptoms at length, rechecking labs today to assess for toxicity.  4/9/25: C3D1 Capecitabine. Needs a new calendar for her chemo pills.  4/30/25: C4D1 Capecitabine. Interval imaging with STERLING. Continues to take iron intermittently.  5/28/25: C5D1 Capecitabine.   #Stage III Colon Cancer: STERLING 4/30/25 on 3 month interval imaging.  - Not appropriate for CIRCULATE given age - Capecitabine x 6 months       - 1000mg/m2 BID Capecitabine D1-D14 (Round down to 2 pills 500 in AM and 3 pills 500 in AM based on BSA 1.5) - Diclofenac gel for hands/feet - CBC, CMP, CEA today - Last interval imaging 4/28/25. Next due at end of treatment.   #Chemotherapy Induced Diarrhea - Loperamide  #Chemotherapy Induced Nausea - Compazine/Zofran  #Chemotherapy Induced Hand Foot Syndrome Prevention - Diclofenac  RTC in 3 weeks to monitor response to therapy

## 2025-05-28 NOTE — HISTORY OF PRESENT ILLNESS
[Disease: _____________________] : Disease: [unfilled] [T: ___] : T[unfilled] [N: ___] : N[unfilled] [de-identified] : Ms. Hendrickson is an 84 yr old woman with no significant PMH who presents for initial consultation for recently diagnosed colon cancer   Chronological Hx of Cancer: November '24-Dec '24: 2 ED visits for enteritis/ileus/obstruction (on CTs); GI PCR negative, FOBT (+), iron deficient 12/23/24-01/10/25: hospitalized to [facility] for abdominal discomfort and multifocal PNA 12/23/24: CT a/p showed SBO vs. 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 Saw surgery for f/u on 1/22/25- wounds healing well, staples removed. Never had prior colonoscopies before these recent hospitalizations, never was sick or hospitalized. Feels well. Eating solids. Has 2-3 meals a day. Has ~2 BMs daily, some loose others well formed depending on diet. No blood in BMs. No straining. Her baseline BMs were BID+ depending on diet. Taking diltiazem but not eliquis.   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 She feels well. She is taking Eliquis. Plan for Xeloda 2 weeks on, one week off. 3/12/25: C2 Xeloda. Does not even notice she is taking oral chemotherapy.  3/9/25: C3 Xeloda. Still continues to notice any side effects, of any kind.  4/30/25: C4 Xeloda. No side effects. Can't gain weight, but that is about all.  5/28/25: C5 Xelda. + 1 week. Just came back from [place] (great-granddaughter graduated high school). No nausea, diarrhea or other symptoms.

## 2025-05-28 NOTE — PHYSICAL EXAM
[Restricted in physically strenuous activity but ambulatory and able to carry out work of a light or sedentary nature] : Status 1- Restricted in physically strenuous activity but ambulatory and able to carry out work of a light or sedentary nature, e.g., light house work, office work [Normal] : no JVD, no calf tenderness, venous stasis changes, varices [de-identified] : Normal WoB [de-identified] : RRR [de-identified] : Nontender, well healing

## 2025-05-28 NOTE — RESULTS/DATA
[FreeTextEntry1] : LABS: 12/31/24: CEA  2.6,  107, CA 19-9 <2  PATHOLOGY REPORTS:  ACCESSION No:  [id number] Patient:     KAVITHA WOODALL Accession:                             [id number] Collected Date/Time:                   12/26/2024 12:35 EST Received Date/Time:                    12/26/2024 12:35 EST Surgical Pathology Report - Auth (Verified) Specimen(s) Submitted 1-Cecal mass bx Final Diagnosis 1.  Colon, cecal mass, biopsy: -   Moderately differentiated invasive adenocarcinoma.  See comment.  Comment:  diagnosis confirmed upon review by another member of the GI  pathology department. Dr. Hartley notified via secure email. Verified by: Cornelius Singh MD (Electronic Signature) Reported on: 12/27/24 14:41 EST, [facility] [place] [address] Phone: [phone]   Fax: [phone] _________________________________________________________________  ACCESSION No:  [id number] Patient:     KAVITHA WOODALL Accession:                             [id number] Collected Date/Time:                   1/8/2025 09:12 EST Received Date/Time:                    1/9/2025 09:12 EST Addendum Report - Auth (Verified) Addendum Reporting Template (CAP June 2021) DNA MISMATCH REPAIR TESTING Specimen Site: Cecum and ileocecal valve Testing Performed on Block Number(s) (specify): 1G Immunohistochemistry Testing (IHC) for Mismatch Repair (MMR) Proteins show following result: MLH1:  Intact nuclear expression MSH2:  Intact nuclear expression MSH6:  Intact nuclear expression PMS2:  Intact nuclear expression MMR Interpretation: No loss of nuclear expression of MMR proteins (MLH1, MSH2, MSH6, and PMS2).  These results show low probability of microsatellite instability-high (MSI-H). There is no evidence of DNA mismatch repair deficiency in the analyzed tissue, indicating there is a low probability for Hernandes syndrome (a common cause hereditary non polyposis colorectal cancer or HNPCC). However, intact expression of all 4 proteins indicates that MMR enzyme tested are intact but does not entirely exclude Hernandes syndrome, as approximately 5 % of families may have a missense mutation (especially MLH1) that can lead to a nonfunctional protein with retained antigenicity. Clinical correlation with further evaluation is suggested.   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 (6.0 cm) arising in a tubular adenoma with high-grade dysplasia - Small vessel lymphovascular invasion present - Perineural invasion present - Margins of resection are negative for neoplasm - One lymph node positive for carcinoma (1/38) - Benign appendix  Histologic Type:   Adenocarcinoma Histologic Grade:   G2, moderately differentiated Tumor Size:  6.0 Centimeters (cm) Tumor Extent:   Invades through muscularis propria into the pericolonic or perirectal tissue Macroscopic Tumor Perforation:   Not identified Lymphatic and / or Vascular Invasion:    Small vessel Perineural Invasion:   Present Tumor Budding Score:   Intermediate (5-9) Number of Tumor Buds:   6 per 'hotspot' field Type of Polyp in which Invasive Carcinoma Arose:    Tubular adenoma Treatment Effect:   No known presurgical therapy Margins Margin Status for Invasive Carcinoma:    All margins negative for invasive carcinoma pTNM Classification (AJCC 8th Edition) pT Category:   pT3 pN Category:   pN1a

## 2025-06-25 NOTE — ASSESSMENT
[Curative] : Goals of care discussed with patient: Curative [FreeTextEntry1] : Ms. Fermin is a 84 year old woman with recent history of stage III colon cancer who presents status post curative intent resection 12/2024.   Today we discussed the natural history of colon cancer, indications for treatment, data from IDEA and MOSAIC which indicate a significant benefit of treatment for stage III colon cancer. We went on to discuss that given the presence of brannon disease, she is considered to harbor high-risk disease and chemotherapy would be appropriate. Although there is ~ a 40% chance that her disease has been eradicated by surgery alone, the addition of adjuvant chemotherapy would reduce that risk by about an additional 25%. In the setting of stage III colon cancer I would normally recommend a combination of 5-FU/Capecitabine and oxaliplatin. However, due to advanced age and based on MOSAIC post-hoc analysis, Oxaliplatin is of questionable benefit given her age. As such, we will proceed with 3-6 months of capecitabine and watch closely for side effects.   2/19/25: C1D1 Capecitabine. Does not yet have diclofenac, will pickup today.  3/12/25: C2D1 Capecitabine. Tolerating extremely well. Needs a PCP. Reinforced compliance, went over symptoms at length, rechecking labs today to assess for toxicity.  4/9/25: C3D1 Capecitabine. Needs a new calendar for her chemo pills.  4/30/25: C4D1 Capecitabine. Interval imaging with TSERLING. Continues to take iron intermittently.  5/28/25: C5D1 Capecitabine.  6/25/25: C6D1 Capecitabine. Tolerating well, no complaints.   #Stage III Colon Cancer: STERLING 4/30/25 on 3 month interval imaging.  - Not appropriate for CIRCULATE given age - Capecitabine x 6 months       - 1000mg/m2 BID Capecitabine D1-D14 (Round down to 2 pills 500 in AM and 3 pills 500 in AM based on BSA 1.5) - Diclofenac gel for hands/feet - CBC, CMP, CEA  - Last interval imaging 4/28/25. Next due at end of treatment.   #Chemotherapy Induced Diarrhea - Loperamide  #Chemotherapy Induced Nausea - Compazine/Zofran  #Chemotherapy Induced Hand Foot Syndrome Prevention - Diclofenac  RTC in 3 weeks to monitor response to therapy

## 2025-06-25 NOTE — HISTORY OF PRESENT ILLNESS
[Disease: _____________________] : Disease: [unfilled] [T: ___] : T[unfilled] [N: ___] : N[unfilled] [de-identified] : Ms. Hendrickson is an 84 yr old woman with no significant PMH who presents for initial consultation for recently diagnosed colon cancer   Chronological Hx of Cancer: November '24-Dec '24: 2 ED visits for enteritis/ileus/obstruction (on CTs); GI PCR negative, FOBT (+), iron deficient 12/23/24-01/10/25: hospitalized to [facility] for abdominal discomfort and multifocal PNA 12/23/24: CT a/p showed SBO vs. 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 Saw surgery for f/u on 1/22/25- wounds healing well, staples removed. Never had prior colonoscopies before these recent hospitalizations, never was sick or hospitalized. Feels well. Eating solids. Has 2-3 meals a day. Has ~2 BMs daily, some loose others well formed depending on diet. No blood in BMs. No straining. Her baseline BMs were BID+ depending on diet. Taking diltiazem but not eliquis.   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 She feels well. She is taking Eliquis. Plan for Xeloda 2 weeks on, one week off. 3/12/25: C2 Xeloda. Does not even notice she is taking oral chemotherapy.  4/9/25: C3 Xeloda. Still continues to notice any side effects, of any kind.  4/30/25: C4 Xeloda. No side effects. Can't gain weight, but that is about all.  5/28/25: C5 Xeloda. + 1 week. Just came back from [place] (great-granddaughter graduated high school). No nausea, diarrhea or other symptoms.  6/25/25: C6 Xeloda. Reports 2-3 formed bowel movements daily, rarely diarrhea. Reports a good appetite and weight is stable.

## 2025-06-25 NOTE — PHYSICAL EXAM
[Restricted in physically strenuous activity but ambulatory and able to carry out work of a light or sedentary nature] : Status 1- Restricted in physically strenuous activity but ambulatory and able to carry out work of a light or sedentary nature, e.g., light house work, office work [Normal] : no JVD, no calf tenderness, venous stasis changes, varices [de-identified] : Normal WoB [de-identified] : RRR [de-identified] : Nontender, well healing

## 2025-06-25 NOTE — RESULTS/DATA
[FreeTextEntry1] : LABS: 12/31/24: CEA  2.6,  107, CA 19-9 <2  PATHOLOGY REPORTS:  ACCESSION No:  [id number] Patient:     KAVITHA WOODALL Accession:                             [id number] Collected Date/Time:                   12/26/2024 12:35 EST Received Date/Time:                    12/26/2024 12:35 EST Surgical Pathology Report - Auth (Verified) Specimen(s) Submitted 1-Cecal mass bx Final Diagnosis 1.  Colon, cecal mass, biopsy: -   Moderately differentiated invasive adenocarcinoma.  See comment.  Comment:  diagnosis confirmed upon review by another member of the GI  pathology department. Dr. Hartley notified via secure email. Verified by: Cornelius Singh MD (Electronic Signature) Reported on: 12/27/24 14:41 EST, [email or web address] [facility] [place] [address] Phone: [phone]   Fax: [phone] _________________________________________________________________  ACCESSION No:  [id number] Patient:     KAVITHA WOODALL Accession:                             [id number] Collected Date/Time:                   1/8/2025 09:12 EST Received Date/Time:                    1/9/2025 09:12 EST Addendum Report - Auth (Verified) Addendum Reporting Template (CAP June 2021) DNA MISMATCH REPAIR TESTING Specimen Site: Cecum and ileocecal valve Testing Performed on Block Number(s) (specify): 1G Immunohistochemistry Testing (IHC) for Mismatch Repair (MMR) Proteins show following result: MLH1:  Intact nuclear expression MSH2:  Intact nuclear expression MSH6:  Intact nuclear expression PMS2:  Intact nuclear expression MMR Interpretation: No loss of nuclear expression of MMR proteins (MLH1, MSH2, MSH6, and PMS2).  These results show low probability of microsatellite instability-high (MSI-H). There is no evidence of DNA mismatch repair deficiency in the analyzed tissue, indicating there is a low probability for Hernandes syndrome (a common cause hereditary non polyposis colorectal cancer or HNPCC). However, intact expression of all 4 proteins indicates that MMR enzyme tested are intact but does not entirely exclude Hernandes syndrome, as approximately 5 % of families may have a missense mutation (especially MLH1) that can lead to a nonfunctional protein with retained antigenicity. Clinical correlation with further evaluation is suggested.   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 (6.0 cm) arising in a tubular adenoma with high-grade dysplasia - Small vessel lymphovascular invasion present - Perineural invasion present - Margins of resection are negative for neoplasm - One lymph node positive for carcinoma (1/38) - Benign appendix  Histologic Type:   Adenocarcinoma Histologic Grade:   G2, moderately differentiated Tumor Size:  6.0 Centimeters (cm) Tumor Extent:   Invades through muscularis propria into the pericolonic or perirectal tissue Macroscopic Tumor Perforation:   Not identified Lymphatic and / or Vascular Invasion:    Small vessel Perineural Invasion:   Present Tumor Budding Score:   Intermediate (5-9) Number of Tumor Buds:   6 per 'hotspot' field Type of Polyp in which Invasive Carcinoma Arose:    Tubular adenoma Treatment Effect:   No known presurgical therapy Margins Margin Status for Invasive Carcinoma:    All margins negative for invasive carcinoma pTNM Classification (AJCC 8th Edition) pT Category:   pT3 pN Category:   pN1a

## 2025-07-30 NOTE — PHYSICAL EXAM
[Restricted in physically strenuous activity but ambulatory and able to carry out work of a light or sedentary nature] : Status 1- Restricted in physically strenuous activity but ambulatory and able to carry out work of a light or sedentary nature, e.g., light house work, office work [Normal] : normal appearance, no rash, nodules, vesicles, ulcers, erythema [de-identified] : Normal WoB [de-identified] : RRR [de-identified] : Nontender, well healing

## 2025-07-30 NOTE — ASSESSMENT
[Curative] : Goals of care discussed with patient: Curative [FreeTextEntry1] : Ms. Fermin is a 84 year old woman with recent history of stage III colon cancer who presents status post curative intent resection 12/2024.   Today we discussed the natural history of colon cancer, indications for treatment, data from [facility] and [facility] which indicate a significant benefit of treatment for stage III colon cancer. We went on to discuss that given the presence of brannon disease, she is considered to harbor high-risk disease and chemotherapy would be appropriate. Although there is ~ a 40% chance that her disease has been eradicated by surgery alone, the addition of adjuvant chemotherapy would reduce that risk by about an additional 25%. In the setting of stage III colon cancer I would normally recommend a combination of 5-FU/Capecitabine and oxaliplatin. However, due to advanced age and based on [facility] post-hoc analysis, Oxaliplatin is of questionable benefit given her age. As such, we will proceed with 3-6 months of capecitabine and watch closely for side effects.   2/19/25: C1D1 Capecitabine. Does not yet have diclofenac, will pickup today.  3/12/25: C2D1 Capecitabine. Tolerating extremely well. Needs a PCP. Reinforced compliance, went over symptoms at length, rechecking labs today to assess for toxicity.  4/9/25: C3D1 Capecitabine. Needs a new calendar for her chemo pills.  4/30/25: C4D1 Capecitabine. Interval imaging with [facility]. Continues to take iron intermittently.  5/28/25: C5D1 Capecitabine.  6/25/25: C6D1 Capecitabine. Tolerating well, no complaints.  7/30/25: Follow up after completing C6 Xeloda 2 weeks ago. Tolerated treatment exceptionally well.   #Stage III Colon Cancer: [facility] 4/30/25 on 3 month interval imaging. Completed Capecitabine x6 months.  - CBC, CMP, CEA  - no active rash, PRN Diclofenac gel for hands/feet - Last interval imaging 4/28/25. Next CT C/A/P due, she is visiting [place] 8/11/25 for a week, prefers to get scans after returning to [place] in August - [facility] 7/30/25 - CT CAP week prior to next visit   #Chemotherapy Induced Diarrhea. - Loperamide  #Chemotherapy Induced Nausea - Compazine/Zofran  #Chemotherapy Induced Hand Foot Syndrome Prevention - Diclofenac  RTC in 3 months to monitor response to therapy, CT imaging week prior

## 2025-07-30 NOTE — REVIEW OF SYSTEMS
[Diarrhea: Grade 1 - Increase of <4 stools per day over baseline; mild increase in ostomy output compared to baseline] : Diarrhea: Grade 1 - Increase of <4 stools per day over baseline; mild increase in ostomy output compared to baseline [Fever] : no fever [Chills] : no chills [Night Sweats] : no night sweats [Fatigue] : no fatigue [Dysphagia] : no dysphagia [Odynophagia] : no odynophagia [Chest Pain] : no chest pain [Palpitations] : no palpitations [Shortness Of Breath] : no shortness of breath [Wheezing] : no wheezing [Cough] : no cough [Abdominal Pain] : no abdominal pain [Vomiting] : no vomiting [Constipation] : no constipation [Dysuria] : no dysuria [Skin Rash] : no skin rash [Easy Bleeding] : no tendency for easy bleeding [Easy Bruising] : no tendency for easy bruising [FreeTextEntry7] : +intermittent diarrhea with some fried foods and vegetables

## 2025-07-30 NOTE — HISTORY OF PRESENT ILLNESS
[Disease: _____________________] : Disease: [unfilled] [T: ___] : T[unfilled] [N: ___] : N[unfilled] [de-identified] : Ms. Hendrickson is an 84 yr old woman with no significant PMH who presents for initial consultation for recently diagnosed colon cancer   Chronological Hx of Cancer: November '24-Dec '24: 2 ED visits for enteritis/ileus/obstruction (on CTs); GI PCR negative, FOBT (+), iron deficient 12/23/24-01/10/25: hospitalized to [facility] for abdominal discomfort and multifocal PNA 12/23/24: CT a/p showed SBO vs. 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 Saw surgery for f/u on 1/22/25- wounds healing well, staples removed. Never had prior colonoscopies before these recent hospitalizations, never was sick or hospitalized. Feels well. Eating solids. Has 2-3 meals a day. Has ~2 BMs daily, some loose others well formed depending on diet. No blood in BMs. No straining. Her baseline BMs were BID+ depending on diet. Taking diltiazem but not eliquis.   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 She feels well. She is taking Eliquis. Plan for Xeloda 2 weeks on, one week off. 3/12/25: C2 Xeloda. Does not even notice she is taking oral chemotherapy.  4/9/25: C3 Xeloda. Still continues to notice any side effects, of any kind.  4/30/25: C4 Xeloda. No side effects. Can't gain weight, but that is about all.  5/28/25: C5 Xeloda. + 1 week. Just came back from [place] (great-granddaughter graduated high school). No nausea, diarrhea or other symptoms.  6/25/25: C6 Xeloda. Reports 2-3 formed bowel movements daily, rarely diarrhea. Reports a good appetite and weight is stable.  7/30/25: Follow up after completing C6 Xeloda 2 weeks ago. Feels well and has a good appetite but had about 5 pound weight loss.

## 2025-07-30 NOTE — RESULTS/DATA
[FreeTextEntry1] : LABS: 12/31/24: CEA  2.6,  107, CA 19-9 <2  PATHOLOGY REPORTS:  ACCESSION No:  [id number] Patient:     KAVITHA WOODALL Accession:                             [id number] Collected Date/Time:                   12/26/2024 12:35 EST Received Date/Time:                    12/26/2024 12:35 EST Surgical Pathology Report - Auth (Verified) Specimen(s) Submitted 1-Cecal mass bx Final Diagnosis 1.  Colon, cecal mass, biopsy: -   Moderately differentiated invasive adenocarcinoma.  See comment.  Comment:  diagnosis confirmed upon review by another member of the GI  pathology department. Dr. Hartley notified via secure email. Verified by: Cornelius Singh MD (Electronic Signature) Reported on: 12/27/24 14:41 EST, [email or web address], [address] [facility] [place] [address] Phone: [phone]   Fax: [phone] _________________________________________________________________  ACCESSION No:  [id number] Patient:     KAVITHA WOODALL Accession:                             [id number] Collected Date/Time:                   1/8/2025 09:12 EST Received Date/Time:                    1/9/2025 09:12 EST Addendum Report - Auth (Verified) Addendum Reporting Template (CAP June 2021) DNA MISMATCH REPAIR TESTING Specimen Site: Cecum and ileocecal valve Testing Performed on Block Number(s) (specify): 1G Immunohistochemistry Testing (IHC) for Mismatch Repair (MMR) Proteins show following result: MLH1:  Intact nuclear expression MSH2:  Intact nuclear expression MSH6:  Intact nuclear expression PMS2:  Intact nuclear expression MMR Interpretation: No loss of nuclear expression of MMR proteins (MLH1, MSH2, MSH6, and PMS2).  These results show low probability of microsatellite instability-high (MSI-H). There is no evidence of DNA mismatch repair deficiency in the analyzed tissue, indicating there is a low probability for Hernandes syndrome (a common cause hereditary non polyposis colorectal cancer or HNPCC). However, intact expression of all 4 proteins indicates that MMR enzyme tested are intact but does not entirely exclude Hernandes syndrome, as approximately 5 % of families may have a missense mutation (especially MLH1) that can lead to a nonfunctional protein with retained antigenicity. Clinical correlation with further evaluation is suggested.   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 (6.0 cm) arising in a tubular adenoma with high-grade dysplasia - Small vessel lymphovascular invasion present - Perineural invasion present - Margins of resection are negative for neoplasm - One lymph node positive for carcinoma (1/38) - Benign appendix  Histologic Type:   Adenocarcinoma Histologic Grade:   G2, moderately differentiated Tumor Size:  6.0 Centimeters (cm) Tumor Extent:   Invades through muscularis propria into the pericolonic or perirectal tissue Macroscopic Tumor Perforation:   Not identified Lymphatic and / or Vascular Invasion:    Small vessel Perineural Invasion:   Present Tumor Budding Score:   Intermediate (5-9) Number of Tumor Buds:   6 per 'hotspot' field Type of Polyp in which Invasive Carcinoma Arose:    Tubular adenoma Treatment Effect:   No known presurgical therapy Margins Margin Status for Invasive Carcinoma:    All margins negative for invasive carcinoma pTNM Classification (AJCC 8th Edition) pT Category:   pT3 pN Category:   pN1a